# Patient Record
Sex: MALE
[De-identification: names, ages, dates, MRNs, and addresses within clinical notes are randomized per-mention and may not be internally consistent; named-entity substitution may affect disease eponyms.]

---

## 2023-06-12 ENCOUNTER — APPOINTMENT (OUTPATIENT)
Dept: UROLOGY | Facility: CLINIC | Age: 68
End: 2023-06-12
Payer: MEDICARE

## 2023-06-12 VITALS
BODY MASS INDEX: 35.34 KG/M2 | HEIGHT: 60 IN | HEART RATE: 73 BPM | DIASTOLIC BLOOD PRESSURE: 78 MMHG | OXYGEN SATURATION: 98 % | SYSTOLIC BLOOD PRESSURE: 118 MMHG | WEIGHT: 179.99 LBS

## 2023-06-12 DIAGNOSIS — N52.9 MALE ERECTILE DYSFUNCTION, UNSPECIFIED: ICD-10-CM

## 2023-06-12 DIAGNOSIS — N40.0 BENIGN PROSTATIC HYPERPLASIA WITHOUT LOWER URINARY TRACT SYMPMS: ICD-10-CM

## 2023-06-12 PROBLEM — Z00.00 ENCOUNTER FOR PREVENTIVE HEALTH EXAMINATION: Status: ACTIVE | Noted: 2023-06-12

## 2023-06-12 PROCEDURE — 99204 OFFICE O/P NEW MOD 45 MIN: CPT | Mod: 25

## 2023-06-12 PROCEDURE — 51798 US URINE CAPACITY MEASURE: CPT

## 2023-06-12 RX ORDER — TADALAFIL 20 MG/1
20 TABLET ORAL
Qty: 30 | Refills: 11 | Status: ACTIVE | COMMUNITY
Start: 2023-06-12 | End: 1900-01-01

## 2023-06-19 ENCOUNTER — NON-APPOINTMENT (OUTPATIENT)
Age: 68
End: 2023-06-19

## 2023-06-19 DIAGNOSIS — R97.20 ELEVATED PROSTATE, SPECIFIC ANTIGEN [PSA]: ICD-10-CM

## 2023-06-19 LAB — PSA SERPL-MCNC: 5.43 NG/ML

## 2023-06-19 NOTE — HISTORY OF PRESENT ILLNESS
[FreeTextEntry1] : Language: Italian (can speak English)\par Accompanied by:self\par Contact info: [phone]\par Referring Provider/PCP: Doesn't see primary care doctors\par Found by Internet Search\par Previous Urologist: Donal Reardonpar \par Initial H&P 2023:\par Mr. Gallegos is a very pleasant 68 year old gentleman who presents today for checkup and ED evaluation. \par \par States that he has occ lower abdominal pain, wants to check prostate. Pain is random.  Thought it might be due to colon issue.\par \par Was previously following with Dr. Mansfield. Can't remember if he was having PSA checks with Donal. \par \par Used to c/o urinary frequency, took some sort of meds, only worked for 1 week, but has been several years since he has had frequency complaints\par \par States that he has a good stream mostly but sometimes weak, occ but rare incomplete emptying, occ intermittency. Only has hesitancy if holding for prolonged periods of time, 1x/night but usually 0.\par \par Denies urgency, GH, PVD\par Not bothered enough to try meds.\par \par Also c/o ED. have been going on for 22y now. tried viagra back then - started with lower dose and has slowly been going up since then.  4y ago, meds stopped completely working (took full dose).  Has also tried cialis and levitra, nothing helped. 0/4 in firmness off viagra, 0.5-1 on viagra. \par \par Donal was planning to recommend IPP before he left. \par ---------------------------------------------------------------------------------------------------------------------------------------\par PMHx: Doesn't see doctors\par PSHx: Lap ccy, B IHR (open, with mesh), TnA\par SHx: from [place], lives with wife, wife used to be an ultrasound tech at [employer] in [place]\par FHx: Father  from cancer (unsure what type, thinks it was pancreatic), no other malignancies in family\par \par All: NKDA\par \par 14 pt ROS neg other than HPI\par ---------------------------------------------------------------------------------------------------------------------------------------\par Physical Exam:\par \par ---------------------------------------------------------------------------------------------------------------------------------------\par Results:\par PVR\par 2023: 13\par ---------------------------------------------------------------------------------------------------------------------------------------\par A/P: 68M with BPH and ED. He is he is emptying his bladder very well with a PVR of 13.\par \par 1. BPH\par We discussed that his constellation of symptoms are likely due to BPH.  I explained to the pathophysiology and natural course of prostatic enlargement, and how it causes the symptoms he is experiencing.  We briefly touched upon the various treatment options for BPH, including but not limited to to p.o. medications, office-based procedures, and surgery.  Among the p.o. medications, we specifically discussed alpha-blocker therapy as well as 5-Per.  To start, I recommended alpha-blocker therapy, specifically tamsulosin 0.4 mg daily. AEs were discussed, including but not limited to anejaculation, orthostatic hypotension, dizziness, lightheadedness, and sinus issues.\par \par His urinary symptoms are only mildly bothersome, therefore he would like to hold off on any medications at this time.  \par \par Today, patient requested a prostate ultrasound as part of his prostate checkup.  We discussed that imaging studies of the prostate may not be necessary, and to start, I recommended a PSA check.  If the PSA is elevated, we will discuss additional imaging modalities such as MRI.  I explained to him that an ultrasound is only good for sizing, and it is not very sensitive for malignancy.  With regards to possible malignancy, I explained that lower abdominal pain would be a very unlikely symptom of prostate cancer, or even prostate enlargement unless the lower abdominal pain is due to severe retention, which he does not have, as his PVR today was 13.\par \par 2. ED\par I had an extensive discussion with the patient regarding his erectile dysfunction. He has tried and failed multiple PDE5 inhibitors and has not had a good response. We reviewed the various treatment modalities for erectile dysfunction and as it relates to him, we discussed the treatments he has tried as well as general goals of treatments for erectile dysfunction in general. \par \par In the end, we reviewed erectile dysfunction treatment consisting of phosphodiesterase inhibitors for those that are without contraindication, vacuum devices, intraurethral alprostadil also known as MUSE, penile injection therapy and the penile implant. He is actively intrigued with the idea of penile injections and we discussed this today at length. \par \par He understands the risks and benefits of penile injections. He specifically understands the side effects from injections could include penile scarring, Peyronie's disease, a risk of priapism, dropout from injection treatment, pain, bruising, or swelling. On the other hand, he realizes that injection treatment is tolerated by many men successfully and that this could give him an erection within 10-15 minutes. \par \par It would not impact any orgasm or ejaculatory response and he is interested in moving forward. I have explained to him that we will start with a test dose injection in the office and then work towards finding his appropriate dose of injection. That dose (along with the necessary supplies) will then be sent to his house. \par \par He is going away to [facility] on , and he will be away for 1 month.  In the interim, since it has been a long time since he tried it, he would like a Cialis prescription.\par \par When he comes back to the office for injection teaching, we will discuss effectiveness of Cialis.  If it has been working, we will cancel the injection teaching.\par \par I have answered all of his questions and will see him on follow-up.\par \par Plan:\par - check PSA\par - RTC 6-8 weeks for ICI teaching or sooner PRN

## 2023-07-24 ENCOUNTER — APPOINTMENT (OUTPATIENT)
Dept: MRI IMAGING | Facility: HOSPITAL | Age: 68
End: 2023-07-24

## 2023-07-24 ENCOUNTER — OUTPATIENT (OUTPATIENT)
Dept: OUTPATIENT SERVICES | Facility: HOSPITAL | Age: 68
LOS: 1 days | End: 2023-07-24
Payer: MEDICARE

## 2023-07-24 ENCOUNTER — RESULT REVIEW (OUTPATIENT)
Age: 68
End: 2023-07-24

## 2023-07-24 PROCEDURE — A9585: CPT

## 2023-07-24 PROCEDURE — 72197 MRI PELVIS W/O & W/DYE: CPT

## 2023-07-24 PROCEDURE — 72197 MRI PELVIS W/O & W/DYE: CPT | Mod: 26

## 2023-08-09 ENCOUNTER — APPOINTMENT (OUTPATIENT)
Dept: UROLOGY | Facility: CLINIC | Age: 68
End: 2023-08-09
Payer: MEDICARE

## 2023-08-09 VITALS
WEIGHT: 180 LBS | BODY MASS INDEX: 27.28 KG/M2 | HEIGHT: 68 IN | DIASTOLIC BLOOD PRESSURE: 82 MMHG | SYSTOLIC BLOOD PRESSURE: 125 MMHG

## 2023-08-09 PROCEDURE — 99214 OFFICE O/P EST MOD 30 MIN: CPT

## 2023-08-09 NOTE — HISTORY OF PRESENT ILLNESS
[FreeTextEntry1] : Language: Kittitian (can speak English) Accompanied by:self Contact info: [phone] Referring Provider/PCP: Doesn't see primary care doctors Found by Internet Search Previous Urologist: Donal   Initial H&P 2023: Mr. Gallegos is a very pleasant 68 year old gentleman who presents today for checkup and ED evaluation.   States that he has occ lower abdominal pain, wants to check prostate. Pain is random.  Thought it might be due to colon issue.  Was previously following with Dr. Mansfield. Can't remember if he was having PSA checks with Donal.   Used to c/o urinary frequency, took some sort of meds, only worked for 1 week, but has been several years since he has had frequency complaints  States that he has a good stream mostly but sometimes weak, occ but rare incomplete emptying, occ intermittency. Only has hesitancy if holding for prolonged periods of time, 1x/night but usually 0.  Denies urgency, GH, PVD Not bothered enough to try meds.  Also c/o ED. have been going on for 22y now. tried viagra back then - started with lower dose and has slowly been going up since then.  4y ago, meds stopped completely working (took full dose).  Has also tried cialis and levitra, nothing helped. 0/4 in firmness off viagra, 0.5-1 on viagra.   Donal was planning to recommend IPP before he left.  --------------------------------------------------------------------------------------------------------------------------------------- Interval History 2023: Presents today for f/u.  Last seen in clinic 2023.  No interval changes in health.   In the interim, he had a prostate MRI, which showed no PIRADS >/= 3 lesions.  The prostate measured 55 g in size with a median lobe protruding into his bladder.  With regards to his erections, he was prescribed cialis at the last visit while he was waiting for his next f/u, to reassess if he has any response to PO meds.  He was out of town over the last several weeks, and only received the cialis a few days ago and has not had a chance to try it yet.  --------------------------------------------------------------------------------------------------------------------------------------- PMHx: Doesn't see doctors PSHx: Lap ccy, B IHR (open, with mesh), TnA SHx: from [place], lives with wife, wife used to be an ultrasound tech at [employer] in [place] FHx: Father  from cancer (unsure what type, thinks it was pancreatic), no other malignancies in family  All: NKDA  14 pt ROS neg other than HPI --------------------------------------------------------------------------------------------------------------------------------------- Physical Exam: General: NAD, sitting on exam table comfortably HEENT: NCAT, EOMI Resp: breathing comfortably on RA, b/l symm chest rise Cardiac: RRR Abd: SNTND Back: (-) CVAT b/l MSK: MAGO gamez/ FROM Psych: appropriate affect  --------------------------------------------------------------------------------------------------------------------------------------- Results: PSA 2023: 5.43  PVR 2023: 13  Prostate MRI 23 ([facility]): moderate BPH with 1.1 cm of median lobe protrusion into the bladder, prostate measuring 55.5 cc, no significant targetable lesions for biopsy. --------------------------------------------------------------------------------------------------------------------------------------- A/P: 68M with BPH, elevated PSA, and ED.   All results were discussed today.  1. Elevated PSA This is a new diagnosis that was made after the patient's last visit.  Over the phone, when I called him about the results of his PSA, we discussed prostate MRI.  He agreed to the MRI, and we discussed the results of the MRI today.  He understands that he has no concerning lesions for cancer, and there is no indication for prostate biopsy at this time based on the MRI findings as well as the PSAD.  I explained that his PSA number is consistent with an enlarged prostate, which measured at 55 g.  He does not have a family history of prostate cancer, however I recommended we continue to monitor his PSA once a year.    I also recommended he see a primary care doctor for overall health maintenance.   2. BPH On his MRI, he had evidence of an intravesical prostate, and the prostate measured at 55 g.  These findings are consistent with BPH.  Once again, I offered him medications if he is significantly bothered by his symptoms, however at this time, starting medications would be only for quality-of-life reasons.  We discussed various medical reasons when treatment would be more strongly recommended/necessary, including but not limited to recurrent urinary tract infections, renal dysfunction due to obstructive uropathy, recurrent episodes of retention requiring prolonged catheterization and/or possible hospitalization, and formation of bladder stones, which is not a hard indication for treatment per AUA guidelines, however is indicative of a more significant obstruction.  He understood, and he will continue to monitor.  3. ED He has not had a chance to try the cialis yet, therefore he will try it over the next 4-6 weeks, and he will RTC afterwards to discuss.   He asked me about other management options WRT ED.  He read an article out of [place] about vein ligation/revascularization to improve blood flow.  He had also done some reading about the implant.  We discussed that I do not know about any sort of revasc procedures.  I was able to talk to him in detail about the implant, however.    In summary, he understands that surgical therapy for his erectile dysfunction is only indicated if he has failed more conservative measures, which he has not yet.   Specifically, he understands that prosthetic surgery in his situation is a complicated task that might require further revision in the future. He also understands that perceived penile shortening is a possibility after this surgery, and that his current stretched penile length is the most predictive factor that determines postoperative penile length. He understands that in the setting of infection, all device components will have to be removed and that in revision cases, the rate of infection seems to be higher in the reported literature that virginal cases. As is the case for penile prosthesis surgery, the patient comprehends that his implanted device will have 3 components (penile shaft cylinders, a penile pump, and a fluid containing reservoir that is placed in the abdominal space). He knows that damage to surrounding structures during prosthesis surgery involving these components is a small but real risk. With manipulation of his abdominal reservoir, specific risks include reservoir herniation out of the inguinal ring, palpability of the reservoir, the need for an additional incision for reservoir placement, further dissection in the abdominal area possibly causing further pain, as well as the risk of bowel injury, vascular injury, and/or bladder injury. Manipulation of the scrotal pump and penile shaft cylinders could lead to hematoma formation in the scrotum, curvature abnormalities of the penis, or migration/herniation of the pump or cylinders to a less suitable area in the scrotum. We also spent some time discussing that glans engorgement is something that is often reported to be less so in those that receive penile implants and that this might be a permanent change following surgery. I have also explained though that the satisfaction overall from this ED treatment is extremely high in many prior reports in the literature. I specifically also discussed the two different types of penile implants that are generally available for patients including the Coloplast Titan touch implant and the AMS LGX/CX implants. I explained that I am well versed and perform many of both types of implants and briefly reviewed the characteristics of each type of implant - including the individual benefits of selecting one over the other. In the end, I have explained that there is robust data that both implants offer patients and their partners a high degree of satisfaction, well over 90%. Lastly, the patient has been counseled regarding the need for penile rehabilitation following surgery. This will involve regular (e.g. daily) manipulation of his penile prosthesis shaft and pump in order to facilitate usage during coitus. He understands that not performing rehabilitation as counseled may result in capsule formation surrounding his device as well as ultimate device malfunction. I have reiterated that penile implantation will render other conservative measures such as PDE5s, ICI, and MUSE therapy largely useless (even if we were to remove the IPP at a later date). The patient specifically understands.  I will see him again in 4-6 weeks or sooner PRN.  If he fails Cialis, I strongly recommended/favor ICI trial prior to implant.  Will perform test erection at next visit if indicated.   Plan: - cont annual PSA checks - establish care with PMD (pt to do this soon) - start cialis - RTC 4-6 weeks or sooner PRN  I spent a total of 35 minutes on face to face counseling, review and discussion of results, coordination of care and clinical documentation.

## 2024-10-25 ENCOUNTER — APPOINTMENT (OUTPATIENT)
Dept: UROLOGY | Facility: CLINIC | Age: 69
End: 2024-10-25
Payer: MEDICARE

## 2024-10-25 VITALS
DIASTOLIC BLOOD PRESSURE: 82 MMHG | BODY MASS INDEX: 30.01 KG/M2 | HEIGHT: 68 IN | SYSTOLIC BLOOD PRESSURE: 138 MMHG | WEIGHT: 198 LBS

## 2024-10-25 PROCEDURE — G2211 COMPLEX E/M VISIT ADD ON: CPT

## 2024-10-25 PROCEDURE — 99214 OFFICE O/P EST MOD 30 MIN: CPT

## 2024-11-01 LAB
PSA FREE FLD-MCNC: 14 %
PSA FREE SERPL-MCNC: 0.9 NG/ML
PSA SERPL-MCNC: 6.37 NG/ML
TESTOST SERPL-MCNC: 520 NG/DL

## 2025-03-18 ENCOUNTER — APPOINTMENT (OUTPATIENT)
Dept: UROLOGY | Facility: CLINIC | Age: 70
End: 2025-03-18